# Patient Record
(demographics unavailable — no encounter records)

---

## 2025-05-23 NOTE — HISTORY OF PRESENT ILLNESS
[FreeTextEntry1] : Swati is a 14Y female who presents with her mother for evaluation of right wrist injury sustained 3/28/2025.  She was playing with her brother when she fell landing on her outstretched right arm.  She Madeline noted pain and swelling of the right wrist.  She was seen at [facility] [place] ED where she had x-rays done which were remarkable for distal radius and ulna fracture.  She was placed in a short arm splint and referred to see peds Ortho.   Last visit we placed her in a wrist immobilizer and she was instructed to remain out of gym/sport. She has been tolerating her brace well without any issues.  Denies any need for pain medication at home.  She is right-hand dominant.  Denies any radiating pain, numbness, tingling sensation.  Here for orthopedic evaluation and management.

## 2025-05-23 NOTE — PHYSICAL EXAM
[FreeTextEntry1] : Gait: Presents ambulating independently without signs of antalgia.  Good coordination and balance noted. GENERAL: alert, cooperative, in NAD SKIN: The skin is intact, warm, pink and dry over the area examined. EYES: Normal conjunctiva, normal eyelids and pupils were equal and round. ENT: normal ears, normal nose and normal lips. CARDIOVASCULAR: brisk capillary refill, but no peripheral edema. RESPIRATORY: The patient is in no apparent respiratory distress. They're taking full deep breaths without use of accessory muscles or evidence of audible wheezes or stridor without the use of a stethoscope. Normal respiratory effort. ABDOMEN: not examined  Focused exam of the right wrist: Skin is clean, dry and intact. There is no clinical deformity. No erythema, ecchymosis or swelling. She is grossly nontender to palpation over distal radius and distal ulna. Passive range of motion is full and painless. Very flexible in wrist motion- 90 degrees flexion and extension. elbow ROM within normal limits  Negative piano sign Negative Finkelstein Neurovascularly intact in radial/ulnar/median/AIN distribution. Radial pulse 2+. Brisk capillary refill in all digits.

## 2025-05-23 NOTE — ASSESSMENT
[FreeTextEntry1] : Swati is a 14Y female with right distal radius and ulna buckle fractures sustained 3/28/25 Today's visit included obtaining history from the parent due to the child's age, the child could not be considered a reliable historian, requiring parent to act as independent historian  Xray was reviewed today demonstrating progressive interval healing   and Long discussion was done with family regarding  diagnosis, treatment options and prognosis  Recommendation at this time would be no further restriction she will resume activities as tolerated long discussion was done with dad regarding the risk of refracture for the next 6-12 months if any concerns she may use removable wrist brace for contact sport follow up as needed This plan was discussed with family. Family verbalizes understanding and agreement of plan. All questions and concerns were addressed today.

## 2025-05-23 NOTE — REASON FOR VISIT
[Follow Up] : a follow up visit [FreeTextEntry1] : right wrist injury, DOI: 3/28/25 [Patient] : patient [Mother] : mother

## 2025-05-23 NOTE — REVIEW OF SYSTEMS
[Change in Activity] : no change in activity [Joint Pains] : no arthralgias [Joint Swelling] : no joint swelling [Nl] : Musculoskeletal

## 2025-05-23 NOTE — DATA REVIEWED
[de-identified] : XR right wrist 3 views performed today  05/23/25: Distal radius and ulna buckle fractures in acceptable alignment. progressive interval healing